# Patient Record
Sex: FEMALE | NOT HISPANIC OR LATINO | URBAN - METROPOLITAN AREA
[De-identification: names, ages, dates, MRNs, and addresses within clinical notes are randomized per-mention and may not be internally consistent; named-entity substitution may affect disease eponyms.]

---

## 2017-11-13 ENCOUNTER — HOSPITAL ENCOUNTER (OUTPATIENT)
Dept: MEDSURG UNIT | Facility: CLINIC | Age: 22
Discharge: HOME | End: 2017-11-13
Attending: STUDENT IN AN ORGANIZED HEALTH CARE EDUCATION/TRAINING PROGRAM | Admitting: STUDENT IN AN ORGANIZED HEALTH CARE EDUCATION/TRAINING PROGRAM

## 2017-11-13 ENCOUNTER — RECORDS - HEALTHEAST (OUTPATIENT)
Dept: ADMINISTRATIVE | Facility: OTHER | Age: 22
End: 2017-11-13

## 2017-11-13 ASSESSMENT — MIFFLIN-ST. JEOR: SCORE: 1345.3

## 2021-05-31 VITALS — WEIGHT: 135 LBS | HEIGHT: 65 IN | BODY MASS INDEX: 22.49 KG/M2

## 2021-06-14 NOTE — PROGRESS NOTES
MD recommends inpatient hospitalization with IV antibiotics and IV fluids. Pt is refusing treatment and hospital stay. AMA release signed by patient and physician after all risks discussed. Pt verbalizes understanding and left the facility. Pt states she will return tonight for treatment.

## 2021-06-14 NOTE — PROGRESS NOTES
Pt presents to [facility] with c/o cramping at 33 weeks. Pt states she does not have a doctor but was seen at an [facility] for a 20 week fetal anatomy scan. Pt states she had kidney stones in her 1st pregnancy and stents were placed. She states she was recently treated for a kidney infection and has completed treatment but feels like maybe she still has a kidney infection. EFM on, admission assessment completed. Resident OB notified of pt admission. Plan for a UA/UC and urine drug screen per policy when pt able to void.

## 2021-06-16 PROBLEM — Z91.199 PATIENT NONCOMPLIANCE: Status: ACTIVE | Noted: 2017-11-13

## 2021-06-16 PROBLEM — O23.03 PYELONEPHRITIS COMPLICATING PREGNANCY IN THIRD TRIMESTER: Status: ACTIVE | Noted: 2017-11-13

## 2021-07-02 NOTE — H&P
H&P by Justine Herrera MD at 2017 11:28 AM     Author: Justine Herrera MD Service: Resident Author Type: Physician    Filed: 2017  2:54 PM Date of Service: 2017 11:28 AM Status: Addendum    : Justine Herrera MD (Physician)    Related Notes: Original Note by Kacy Conner DO (Resident) filed at 2017  2:15 PM       [facility] ADMISSION HISTORY & PHYSICAL    DATE OF ADMISSION: 2017 10:37 AM    CHIEF COMPLAINT: Right flank pain    HISTORY OF PRESENT ILLNESS:   Kimberly Denny is a 22 y.o. year old  female at 33w0d confirmed by 13w2d ultrasound. Patient received scant prenatal care with Dr. Francisco Santamaria,  [facility].   Admitted to [facility] on 2017 10:37 AM for complicated, recurrent pyelonephritis of pregnancy. Current prenatal history significant for scant prenatal care with only 2 outpatient OB visits.     Also, the patient has a hx of inadequately treated UTI/Pyelonephritis with multiple drug resistance requiring IV antibiotics in the second trimester of this pregnancy. She has a history of complicated pyelonephritis with hydronephrosis in her previous pregnancy requiring nephrostomy tube placement and IV antibiotics. In 2017, the patient was found to have a UTI and prescribed PO Macrobid which she did not take. She was then admitted to [facility] in 2017 and found to have Pyelonephritis. Urine culture from this hospitalization grew Enterobacter with multiple drug resistances. She was treated with IV/IM cefepime and then sent home on PO Bactrim. Patient did not take PO Bactrim after discharge.     Patient presented to OB triage today complaining of right flank pain, contractions, nausea without vomiting and fevers since Thursday. She endorses associated decreased appetite but good fluid intake. Endorses associated malaise.     Denies headaches, vision changes, abdominal pain including RUQ,  "vaginal bleeding, discharge, loss of fluids, dysuria, hematuria, diarrhea or edema.  Fetal movement normal.    Reports that she quit smoking 2-3 weeks ago. Denies illicit drug use and EtOH use.     Estimated Date of Delivery: 18 No LMP recorded. Patient is pregnant.  OB provider: Tana Duggan MD  OB History      Para Term  AB Living    2 1 1 0 0 1    SAB TAB Ectopic Multiple Live Births                PAST MEDICAL HISTORY:    1. Congential vesico-ureteral reflux   2. Right nephrostomy tube place 1/15/16 and replaced 3/31/16 while pregnant due to hydronephrosis - tx'd with cefepime q8h  3. Edema affecting pregnancy in third trimester in previous pregnancy.   4. Vitamin D deficiency  5. Hypokalemia  6. Anemia, unspecified  7. Gonorrhea and Chlamydia infection in 2016     PAST SURGICAL HISTORY:    1. IR Nephrostomy Tube Right, 1/15/16  2. 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 HX URETERAL REIMPLANTATION  ? age 7 per Nephrology notes   urinary reflux     MEDICATIONS:   No current facility-administered medications on file prior to encounter.      No current outpatient prescriptions on file prior to encounter.     ALLERGIES:   No Known Allergies    FAMILY HISTORY:    No family history on file.  Reviewed, additions include none     SOCIAL HISTORY AND HABITS:   ETOH: Denies  Tobacco: Denies  Illicit substances: Denies  Work: employed  Martial status: single  Father of the child: is not involved with the pregnancy    REVIEW OF SYSTEMS:   Pertinent items are noted in HPI.  Membranes   intact       OBJECTIVE:   Blood pressure 120/58, temperature 98.8  F (37.1  C), temperature source Oral, resp. rate 16, height 5' 4.5\" (1.638 m), weight 135 lb (61.2 kg).  General:   alert, appears stated age, cooperative and no distress   Skin:   normal and no rash or abnormalities   HEENT:  extra ocular movement intact, sclera clear, " anicteric, oropharynx clear, no lesions, neck supple with midline trachea and thyroid without masses   Lungs:   clear to auscultation bilaterally   Heart:   regular rate and rhythm, S1, S2 normal, no murmur, click, rub or gallop   Back: Severe R CVA tenderness. No L CVA tenderness.    Abdomen: FHT present and nontender   Membranes: intact   [place]:  Frequency: Every 5-12 minutes, Duration: 60 seconds and Intensity: mild   FHT:  Baseline: 140 bpm, Variability: Moderate (6 - 25 bpm), Accelerations: Present and Decelerations: Absent   Cervix: Examined by myself    Dilation: Closed   Effacement: Long   Station:  -3   Consistency: firm   Position: posterior       Hemoglobin: 9.7 Date: 11/13/17   Platelets: 354 Date: 11/13/17  Syphilis:  Non-reactive  HIV:  Non-reactive  Chlamydia/Gonorrhea: pending  A, Rh+, Rubella-immune, Hepatitis B surface antigen non-reactive  One hour GTT: did not complete    Results for orders placed or performed during the hospital encounter of 11/13/17   Urinalysis-UC if Indicated   Result Value Ref Range    Color, UA Yellow Colorless, Yellow, Straw, Light Yellow    Clarity, UA Cloudy (!) Clear    Glucose, UA Negative Negative    Bilirubin, UA Negative Negative    Ketones, UA Negative Negative    Specific Gravity, UA 1.005 1.001 - 1.030    Blood, UA Negative Negative    pH, UA 8.0 4.5 - 8.0    Protein, UA 30 mg/dL (!) Negative mg/dL    Urobilinogen, UA <2.0 E.U./dL <2.0 E.U./dL, 2.0 E.U./dL    Nitrite, UA Positive (!) Negative    Leukocytes, UA Large (!) Negative    Bacteria, UA Few (!) None Seen hpf    RBC, UA None Seen None Seen, 0-2 hpf    WBC, UA >100 (!) None Seen, 0-5 hpf    Squam Epithel, UA 5-10 (!) None Seen, 0-5 lpf   OB External Results   Result Value Ref Range    ABO/Rh External Result A Positive     Antibody External Result negative     Rubella External Result Immune     HBsAg External Result Negative     HIV External Result Negative    Basic Metabolic Panel   Result Value Ref Range     Sodium 137 136 - 145 mmol/L    Potassium 3.8 3.5 - 5.0 mmol/L    Chloride 107 98 - 107 mmol/L    CO2 24 22 - 31 mmol/L    Anion Gap, Calculation 6 5 - 18 mmol/L    Glucose 83 70 - 125 mg/dL    Calcium 8.3 (L) 8.5 - 10.5 mg/dL    BUN 8 8 - 22 mg/dL    Creatinine 0.55 (L) 0.60 - 1.10 mg/dL    GFR MDRD Af Amer >60 >60 mL/min/1.73m2    GFR MDRD Non Af Amer >60 >60 mL/min/1.73m2   TSH   Result Value Ref Range    TSH 1.25 0.30 - 5.00 uIU/mL   Drug Abuse 1+, Urine   Result Value Ref Range    Amphetamines Screen Negative Screen Negative    Benzodiazepines Screen Negative Screen Negative    Opiates Screen Negative Screen Negative    Phencyclidine Screen Negative Screen Negative    THC (!) Screen Negative     Screen Positive (Confirmation available on request)    Barbiturates Screen Negative Screen Negative    Cocaine Metabolite Screen Negative Screen Negative    Methadone Screen Negative Screen Negative    Oxycodone Screen Negative Screen Negative    Creatinine, Urine 96.7 mg/dL   HM1 (CBC with Diff)   Result Value Ref Range    WBC 11.1 (H) 4.0 - 11.0 thou/uL    RBC 3.15 (L) 3.80 - 5.40 mill/uL    Hemoglobin 9.7 (L) 12.0 - 16.0 g/dL    Hematocrit 29.4 (L) 35.0 - 47.0 %    MCV 93 80 - 100 fL    MCH 30.8 27.0 - 34.0 pg    MCHC 33.0 32.0 - 36.0 g/dL    RDW 12.6 11.0 - 14.5 %    Platelets 354 140 - 440 thou/uL    MPV 8.6 8.5 - 12.5 fL    Neutrophils % 73 (H) 50 - 70 %    Lymphocytes % 18 (L) 20 - 40 %    Monocytes % 8 2 - 10 %    Eosinophils % 0 0 - 6 %    Basophils % 0 0 - 2 %    Neutrophils Absolute 7.9 (H) 2.0 - 7.7 thou/uL    Lymphocytes Absolute 2.0 0.8 - 4.4 thou/uL    Monocytes Absolute 0.9 0.0 - 0.9 thou/uL    Eosinophils Absolute 0.0 0.0 - 0.4 thou/uL    Basophils Absolute 0.0 0.0 - 0.2 thou/uL     Us Kidney Bilateral    Result Date: 11/13/2017  US KIDNEY BILATERAL WITH BLADDER 11/13/2017 1:00 PM INDICATION: assess for hydronephrosis. Recent urinary tract infection. Third trimester pregnancy. TECHNIQUE: Routine kidneys  and bladder. COMPARISON: None. FINDINGS: Right kidney measures 13.6 x 8.1 x 6.1 cm. Prominent right hydronephrosis. No stone or solid mass evident. Left kidney measures 13 x 6 x 5.5 cm. No hydronephrosis. No mass. Normal renal echotexture. Bladder is collapsed and empty     CONCLUSION: 1.  Prominent right-sided hydronephrosis.      ASSESSMENT & PLAN:    Kimberly Denny is a  22 y.o. female admitted to [facility] at 33w0d on 2017 with recurrent, complicated pyelonephritis affecting pregnancy in 3rd trimester. Patient afebrile. Benign exam except right CVA tenderness. Not in labor. Normal reactive NST. Mild intermittent contractions on tocometer. UA significant for infection. CBC shows borderline leukocytosis, normocytic anemia. BMP grossly normal except mild hypocalcemia. Drug screen positive for THC. Ultrasound shows prominent right sided hydronephrosis. Patient with hx of nephrostomy tube placement, hx of hydronephrosis in previous pregnancy and pyelonephritis in second trimester inadequately treated with antibiotics. Patient with poor medical compliance and scant prenatal care. Curbsided ID, who advised that the only adequate treatment for this patient is IV/IM antibiotics. Discussed lab results with patient at length. Strongly advised inpatient admission with IV antibiotics and fluids. Patient refused care and decided to leave against medical advice. Patient stated she had to leave to go home to care for her son, despite many options offered to manage care for her son with hospital admission. Discussed at length risks of leaving without treatment, including death, fetal demise,  labor, bacteremia, sepsis and patient expressed understanding of risks but chose to leave AMA. Recommended patient return to the hospital as soon as possible for treatment. Patient expressed that she plans to return to hospital tonight. Would initiate patient on IV Cefepime and IV fluids with  consult to ID, Urology if patient returns.     Patient left AMA. AMA paperwork signed.    Patient seen and discussed with attending physician, Dr.Kathryn Herrera, who agrees with the plan.     Kacy Conner PGY2 11/13/2017  [facility]     11/13/2017  [facility] Faculty Attestation  I have seen and examined the patient.  Discussed risks of leaving without IV antibiotics with patient, including death and fetal demise.  Patient shares that she will come back tonight once she has a plan in place for care for her son.  Currently leaving AMA.  This note should be viewed as a hospital admission, with AMA discharge, NOT a labor and delivery triage note.   I have discussed the case with the resident physician(s), Dr. Conner.  .  I agree with the findings, assessment and plan.    Justine Herrera MD